# Patient Record
Sex: FEMALE | Race: WHITE | NOT HISPANIC OR LATINO | Employment: UNEMPLOYED | RURAL
[De-identification: names, ages, dates, MRNs, and addresses within clinical notes are randomized per-mention and may not be internally consistent; named-entity substitution may affect disease eponyms.]

---

## 2021-07-13 DIAGNOSIS — M15.0 PRIMARY GENERALIZED HYPERTROPHIC OSTEOARTHROSIS: Primary | ICD-10-CM

## 2021-07-13 DIAGNOSIS — M54.50 LUMBAGO: ICD-10-CM

## 2021-07-30 ENCOUNTER — CLINICAL SUPPORT (OUTPATIENT)
Dept: REHABILITATION | Facility: HOSPITAL | Age: 76
End: 2021-07-30
Payer: MEDICARE

## 2021-07-30 DIAGNOSIS — M15.0 PRIMARY GENERALIZED HYPERTROPHIC OSTEOARTHROSIS: ICD-10-CM

## 2021-07-30 DIAGNOSIS — M54.50 LUMBAGO: ICD-10-CM

## 2021-07-30 PROCEDURE — 97163 PT EVAL HIGH COMPLEX 45 MIN: CPT

## 2025-07-02 ENCOUNTER — HOSPITAL ENCOUNTER (EMERGENCY)
Facility: HOSPITAL | Age: 80
Discharge: HOME | End: 2025-07-02
Payer: MEDICARE

## 2025-07-02 VITALS
WEIGHT: 159 LBS | RESPIRATION RATE: 18 BRPM | HEART RATE: 74 BPM | DIASTOLIC BLOOD PRESSURE: 82 MMHG | HEIGHT: 63 IN | OXYGEN SATURATION: 93 % | SYSTOLIC BLOOD PRESSURE: 137 MMHG | BODY MASS INDEX: 28.17 KG/M2 | TEMPERATURE: 98 F

## 2025-07-02 DIAGNOSIS — K57.92 DIVERTICULITIS: ICD-10-CM

## 2025-07-02 DIAGNOSIS — M62.838 MUSCLE SPASM: ICD-10-CM

## 2025-07-02 DIAGNOSIS — N30.01 ACUTE CYSTITIS WITH HEMATURIA: Primary | ICD-10-CM

## 2025-07-02 LAB
BACTERIA #/AREA URNS HPF: ABNORMAL /HPF
BILIRUB UR QL STRIP: NEGATIVE
CLARITY UR: CLEAR
COLOR UR: YELLOW
GLUCOSE UR STRIP-MCNC: NEGATIVE MG/DL
KETONES UR STRIP-SCNC: NEGATIVE MG/DL
LEUKOCYTE ESTERASE UR QL STRIP: ABNORMAL
NITRITE UR QL STRIP: NEGATIVE
PH UR STRIP: 5.5 PH UNITS
PROT UR QL STRIP: NEGATIVE
RBC # UR STRIP: NEGATIVE /UL
RBC #/AREA URNS HPF: ABNORMAL /HPF
SP GR UR STRIP: 1.01
SQUAMOUS #/AREA URNS LPF: ABNORMAL /LPF
UROBILINOGEN UR STRIP-ACNC: 0.2 MG/DL
WBC #/AREA URNS HPF: ABNORMAL /HPF

## 2025-07-02 PROCEDURE — 99285 EMERGENCY DEPT VISIT HI MDM: CPT | Mod: 25

## 2025-07-02 PROCEDURE — 87186 SC STD MICRODIL/AGAR DIL: CPT | Performed by: NURSE PRACTITIONER

## 2025-07-02 PROCEDURE — 25000003 PHARM REV CODE 250: Performed by: NURSE PRACTITIONER

## 2025-07-02 PROCEDURE — 63600175 PHARM REV CODE 636 W HCPCS: Performed by: NURSE PRACTITIONER

## 2025-07-02 PROCEDURE — 81003 URINALYSIS AUTO W/O SCOPE: CPT | Performed by: NURSE PRACTITIONER

## 2025-07-02 PROCEDURE — 96374 THER/PROPH/DIAG INJ IV PUSH: CPT

## 2025-07-02 PROCEDURE — 96375 TX/PRO/DX INJ NEW DRUG ADDON: CPT

## 2025-07-02 PROCEDURE — 99284 EMERGENCY DEPT VISIT MOD MDM: CPT | Mod: ,,, | Performed by: NURSE PRACTITIONER

## 2025-07-02 RX ORDER — ORPHENADRINE CITRATE 30 MG/ML
30 INJECTION INTRAMUSCULAR; INTRAVENOUS
Status: COMPLETED | OUTPATIENT
Start: 2025-07-02 | End: 2025-07-02

## 2025-07-02 RX ORDER — POTASSIUM CHLORIDE 750 MG/1
10 TABLET, EXTENDED RELEASE ORAL ONCE
COMMUNITY
Start: 2025-04-03 | End: 2026-04-03

## 2025-07-02 RX ORDER — FUROSEMIDE 40 MG/1
20 TABLET ORAL DAILY
COMMUNITY
Start: 2025-04-03

## 2025-07-02 RX ORDER — HYDROCODONE BITARTRATE AND ACETAMINOPHEN 10; 325 MG/1; MG/1
1 TABLET ORAL EVERY 12 HOURS PRN
COMMUNITY
Start: 2025-06-24

## 2025-07-02 RX ORDER — CYCLOBENZAPRINE HCL 5 MG
5 TABLET ORAL 3 TIMES DAILY PRN
Qty: 15 TABLET | Refills: 0 | Status: SHIPPED | OUTPATIENT
Start: 2025-07-02 | End: 2025-07-07

## 2025-07-02 RX ORDER — CYCLOBENZAPRINE HCL 5 MG
10 TABLET ORAL
Status: COMPLETED | OUTPATIENT
Start: 2025-07-02 | End: 2025-07-02

## 2025-07-02 RX ORDER — DOCUSATE SODIUM 100 MG/1
100 CAPSULE, LIQUID FILLED ORAL 2 TIMES DAILY
COMMUNITY
Start: 2025-04-03

## 2025-07-02 RX ORDER — ONDANSETRON HYDROCHLORIDE 2 MG/ML
4 INJECTION, SOLUTION INTRAVENOUS
Status: COMPLETED | OUTPATIENT
Start: 2025-07-02 | End: 2025-07-02

## 2025-07-02 RX ORDER — GABAPENTIN 800 MG/1
800 TABLET ORAL 3 TIMES DAILY
COMMUNITY
Start: 2025-06-12

## 2025-07-02 RX ORDER — AMITRIPTYLINE HYDROCHLORIDE 75 MG/1
75 TABLET ORAL NIGHTLY
COMMUNITY
Start: 2025-04-28 | End: 2025-07-27

## 2025-07-02 RX ORDER — SUCRALFATE 1 G/1
1 TABLET ORAL
COMMUNITY
Start: 2025-06-19

## 2025-07-02 RX ORDER — BACLOFEN 10 MG/1
10 TABLET ORAL 2 TIMES DAILY
COMMUNITY
Start: 2025-04-03

## 2025-07-02 RX ORDER — SULFAMETHOXAZOLE AND TRIMETHOPRIM 800; 160 MG/1; MG/1
1 TABLET ORAL 2 TIMES DAILY
Qty: 10 TABLET | Refills: 0 | Status: SHIPPED | OUTPATIENT
Start: 2025-07-02 | End: 2025-07-07

## 2025-07-02 RX ORDER — KETOROLAC TROMETHAMINE 30 MG/ML
15 INJECTION, SOLUTION INTRAMUSCULAR; INTRAVENOUS
Status: COMPLETED | OUTPATIENT
Start: 2025-07-02 | End: 2025-07-02

## 2025-07-02 RX ORDER — CEFTRIAXONE 1 G/1
1 INJECTION, POWDER, FOR SOLUTION INTRAMUSCULAR; INTRAVENOUS
Status: COMPLETED | OUTPATIENT
Start: 2025-07-02 | End: 2025-07-02

## 2025-07-02 RX ADMIN — CEFTRIAXONE SODIUM 1 G: 1 INJECTION, POWDER, FOR SOLUTION INTRAMUSCULAR; INTRAVENOUS at 08:07

## 2025-07-02 RX ADMIN — CYCLOBENZAPRINE HYDROCHLORIDE 10 MG: 5 TABLET, FILM COATED ORAL at 08:07

## 2025-07-02 RX ADMIN — KETOROLAC TROMETHAMINE 15 MG: 30 INJECTION, SOLUTION INTRAMUSCULAR at 07:07

## 2025-07-02 RX ADMIN — ORPHENADRINE CITRATE 30 MG: 60 INJECTION INTRAMUSCULAR; INTRAVENOUS at 07:07

## 2025-07-02 RX ADMIN — ONDANSETRON 4 MG: 2 INJECTION INTRAMUSCULAR; INTRAVENOUS at 07:07

## 2025-07-02 NOTE — ED PROVIDER NOTES
Encounter Date: 7/2/2025       History     Chief Complaint   Patient presents with    Abdominal Pain    Back Pain     C/O pain in right lower back that radiates to right lower abdomen, stated that she has been hurting x 4 days. Also c/o dysuria     81 yo female ambulatory to ED with c/o right flank pain that radiates into right abdomen. No hx of kidney stones. +dysuria. Flank is tender to palpation. Denies injury/trauma. Has been ongoing for 4 days, states she has pretty much been bedridden d/t pain. Patient took a 10 mg Norco appx 1500 with no relief.     The history is provided by the patient and a relative.     Review of patient's allergies indicates:  No Known Allergies  Past Medical History:   Diagnosis Date    Arthritis     GERD (gastroesophageal reflux disease)     Hypertension      Past Surgical History:   Procedure Laterality Date    BRAIN SURGERY      CHOLECYSTECTOMY      HYSTERECTOMY       No family history on file.  Social History[1]  Review of Systems   Constitutional:  Positive for fatigue. Negative for chills and fever.   Respiratory:  Negative for cough, shortness of breath and wheezing.    Gastrointestinal:  Positive for nausea. Negative for vomiting.   Genitourinary:  Positive for flank pain.   Musculoskeletal:  Positive for back pain.   Neurological:  Negative for dizziness and headaches.   Psychiatric/Behavioral:  Negative for confusion.    All other systems reviewed and are negative.      Physical Exam     Initial Vitals [07/02/25 1913]   BP Pulse Resp Temp SpO2   (!) 167/84 74 (!) 22 98.1 °F (36.7 °C) (!) 94 %      MAP       --         Physical Exam    Nursing note and vitals reviewed.  Constitutional: She appears well-developed and well-nourished. She is cooperative.   HENT:   Head: Normocephalic and atraumatic.   Right Ear: External ear normal.   Left Ear: External ear normal.   Nose: Nose normal. Mouth/Throat: Oropharynx is clear and moist.   Eyes: EOM are normal. Pupils are equal, round,  and reactive to light.   Neck: Neck supple.   Normal range of motion.  Cardiovascular:  Normal rate, regular rhythm and normal heart sounds.           Pulmonary/Chest: Breath sounds normal.   Abdominal: Abdomen is soft. She exhibits no distension. There is no abdominal tenderness.   Musculoskeletal:      Cervical back: Normal, normal range of motion and neck supple.      Thoracic back: Normal.      Lumbar back: Spasms present. No bony tenderness.     Neurological: She is alert and oriented to person, place, and time. She has normal strength. GCS score is 15. GCS eye subscore is 4. GCS verbal subscore is 5. GCS motor subscore is 6.   Skin: Skin is warm. Capillary refill takes 2 to 3 seconds.   Psychiatric: She has a normal mood and affect. Her behavior is normal. Judgment and thought content normal.         Medical Screening Exam   See Full Note    ED Course   Procedures  Labs Reviewed   URINALYSIS, REFLEX TO URINE CULTURE - Abnormal       Result Value    Color, UA Yellow      Clarity, UA Clear      pH, UA 5.5      Leukocytes, UA Small (*)     Nitrites, UA Negative      Protein, UA Negative      Glucose, UA Negative      Ketones, UA Negative      Urobilinogen, UA 0.2      Bilirubin, UA Negative      Blood, UA Negative      Specific [place], UA 1.015     URINALYSIS, MICROSCOPIC - Abnormal    WBC, UA 5-10 (*)     RBC, UA None Seen      Bacteria, UA Moderate (*)     Squamous Epithelial Cells, UA Many (*)     Narrative:     MANY CLUE CELLS PRESENT   CULTURE, URINE          Imaging Results              CT Abdomen Pelvis  Without Contrast (Final result)  Result time 07/02/25 20:17:18      Final result by Santos Hilton MD (07/02/25 20:17:18)                   Impression:      1. Allowing for motion artifact, no findings to suggest obstructive uropathy.  2. Pneumobilia status post cholecystectomy, correlation with surgical history recommended.  3. Colonic diverticulosis without diverticulitis.  4. Please see above for  several additional findings.      Electronically signed by: Santos Hilton MD  Date:    07/02/2025  Time:    20:17               Narrative:    EXAMINATION:  CT ABDOMEN PELVIS WITHOUT CONTRAST    CLINICAL HISTORY:  Flank pain, kidney stone suspected;Nausea/vomiting;hematuria;    TECHNIQUE:  Low dose axial images, sagittal and coronal reformations were obtained from the lung bases to the pubic symphysis.  Oral contrast was not administered.    COMPARISON:  None    FINDINGS:  Images of the lower thorax are remarkable for bilateral dependent atelectasis/scarring noting motion artifact limits evaluation of the pulmonary parenchyma.  Dependent edema is a consideration.    The liver, spleen, pancreas and right adrenal gland are unremarkable.  There is nonspecific thickening of the left adrenal gland.  The gallbladder is surgically absent.  There is pneumobilia.  The common duct is not significantly dilated status post cholecystectomy measuring up to 9 mm.  The stomach is nondistended, no gastric wall thickening.  No significant abdominal lymphadenopathy.    The kidneys have a grossly unremarkable noncontrast appearance without hydronephrosis or nephrolithiasis.  The bilateral ureters are unremarkable without definite calculi seen noting the ureters cannot be followed in their entirety to the urinary bladder.  No secondary findings to suggest obstructive uropathy.  The urinary bladder is unremarkable.  The uterus is absent the adnexa is unremarkable.    There are several scattered colonic diverticula without inflammation to suggest diverticulitis.  The terminal ileum is unremarkable.  The appendix is not confidently identified, no pericecal inflammation.  The small bowel is grossly unremarkable.  There are a few scattered shotty periaortic, pericaval, and mesenteric lymph nodes.  There is atherosclerotic calcification of the aorta and its branches.    There is osteopenia.  There are degenerative changes of the bilateral  sacroiliac joints and spine.  Degenerative change most significantly involves L4-L5.  No significant inguinal lymphadenopathy.                                       Medications   cyclobenzaprine tablet 10 mg (has no administration in time range)   cefTRIAXone injection 1 g (has no administration in time range)   orphenadrine injection 30 mg (30 mg Intravenous Given 7/2/25 1923)   ketorolac injection 15 mg (15 mg Intravenous Given 7/2/25 1922)   ondansetron injection 4 mg (4 mg Intravenous Given 7/2/25 1923)     Medical Decision Making  79 yo female ambulatory to ED with c/o right flank pain that radiates into right abdomen. No hx of kidney stones. +dysuria. Flank is tender to palpation. Denies injury/trauma. Has been ongoing for 4 days, states she has pretty much been bedridden d/t pain.     The history is provided by the patient and a relative.     Vitals reviewed  Labs reviewed: Urinalysis with 5-10 WBC, moderate bacteria, small leukocytes  CT A/P with no stone, diverticulitis with no diverticulosis  Patient given Toradol and Norflex with slight improvement in pain  Given Flexeril and Rocephin prior to discharge  Discharged home with Flexeril  Strict return and follow-up precautions have been given by me personally to the patient/family/caregiver(s).    Data Reviewed/Counseling: I have reviewed the patient's vital signs, nursing notes, and other relevant tests/information. I had a detailed discussion regarding the historical points, exam findings, and any diagnostic results supporting the discharge diagnosis. I also discussed the need for outpatient follow-up and the need to return to the ED if symptoms worsen or if there are any questions or concerns that arise at home.        Amount and/or Complexity of Data Reviewed  Labs: ordered. Decision-making details documented in ED Course.  Radiology: ordered. Decision-making details documented in ED Course.    Risk  Prescription drug management.               ED Course  as of 07/02/25 2041 Wed Jul 02, 2025 1929 WBC, UA(!): 5-10 [MJ]   1929 Bacteria, UA(!): Moderate [MJ]   1929 Leukocyte Esterase, UA(!): Small [MJ]      ED Course User Index  [MJ] Bree Stanton FNP                           Clinical Impression:   Final diagnoses:  [K57.92] Diverticulitis  [M62.838] Muscle spasm  [N30.01] Acute cystitis with hematuria (Primary)        ED Disposition Condition    Discharge Stable          ED Prescriptions       Medication Sig Dispense Start Date End Date Auth. Provider    cyclobenzaprine (FLEXERIL) 5 MG tablet Take 1 tablet (5 mg total) by mouth 3 (three) times daily as needed for Muscle spasms. 15 tablet 7/2/2025 7/7/2025 Bree Stanton FNP    sulfamethoxazole-trimethoprim 800-160mg (BACTRIM DS) 800-160 mg Tab Take 1 tablet by mouth 2 (two) times daily. for 5 days 10 tablet 7/2/2025 7/7/2025 Bree Stanton FNP          Follow-up Information    None            [1]   Social History  Tobacco Use    Smoking status: Never     Passive exposure: Never    Smokeless tobacco: Never   Vaping Use    Vaping status: Never Used   Substance Use Topics    Alcohol use: Never    Drug use: Never        Bree Stanton FNP  07/02/25 2042

## 2025-07-03 NOTE — DISCHARGE INSTRUCTIONS
Take all of your antibiotics, even if you are feeling better. Flexeril instead of Baclofen if it works better for you. Ice back for 15 minutes at a time, several times a day and stretch. Follow up with primary care provider as needed. Return to the ER for worsening symptoms.     The examination and treatment you have received in the Emergency Department today have been rendered on an emergency basis only and are not intended to be a substitute for an effort to provide complete medical care. You should contact your follow-up physician as it is important that you let him or her check you and report any new or remaining problems since it is impossible to recognize and treat all elements of an injury or illness in a single emergency care center visit.

## 2025-07-03 NOTE — ED NOTES
Patient discharged to home via private vehicle, IV removed with no active bleeding noted. No signs of adverse reaction to meds po or iv. Discharge instructions given with voiced understanding. Patient opted to ambulate to vehicle driven by granddaughter. W/C offered, patient declined. KINZA

## 2025-07-03 NOTE — ED TRIAGE NOTES
PATIENT ARRIVED TO ED VIA PRIVATE VEHICLE WITH GRANDDAUGHTER WITH C/O RIGHT LOWER BACK PAIN THAT RADIATES TO RIGHT LOWER ABD , ALSO VOICED DYSURIA , SYMPTOMS X 4 DAYS. STATED THAT SHE TOOK A NORCO 10 MG AROUND 2:30P . VOICED SOME NAUSEA.

## 2025-07-06 LAB — UA COMPLETE W REFLEX CULTURE PNL UR: ABNORMAL
